# Patient Record
Sex: FEMALE | Race: WHITE | NOT HISPANIC OR LATINO | URBAN - METROPOLITAN AREA
[De-identification: names, ages, dates, MRNs, and addresses within clinical notes are randomized per-mention and may not be internally consistent; named-entity substitution may affect disease eponyms.]

---

## 2022-04-09 ENCOUNTER — HISTORICAL (OUTPATIENT)
Dept: ADMINISTRATIVE | Facility: HOSPITAL | Age: 67
End: 2022-04-09
Payer: COMMERCIAL

## 2022-04-25 VITALS
DIASTOLIC BLOOD PRESSURE: 76 MMHG | WEIGHT: 169.75 LBS | HEIGHT: 62 IN | SYSTOLIC BLOOD PRESSURE: 126 MMHG | BODY MASS INDEX: 31.24 KG/M2

## 2022-08-30 PROBLEM — E66.9 OBESITY: Status: ACTIVE | Noted: 2022-08-30

## 2022-08-30 PROBLEM — I10 BENIGN ESSENTIAL HYPERTENSION: Status: ACTIVE | Noted: 2022-08-30

## 2022-08-30 PROBLEM — E55.9 VITAMIN D DEFICIENCY: Status: ACTIVE | Noted: 2022-08-30

## 2022-08-30 PROBLEM — E78.5 HYPERLIPIDEMIA: Status: ACTIVE | Noted: 2022-08-30

## 2022-08-30 PROBLEM — N32.81 OAB (OVERACTIVE BLADDER): Status: ACTIVE | Noted: 2022-08-30

## 2023-02-03 ENCOUNTER — TELEPHONE (OUTPATIENT)
Dept: ORTHOPEDICS | Facility: CLINIC | Age: 68
End: 2023-02-03
Payer: COMMERCIAL

## 2023-02-03 NOTE — TELEPHONE ENCOUNTER
PT called asking if you could take a look at her bone density scan that she had done and asked if she would need to come in for results or if you can call her with them. Last seen pt in March of 2022.

## 2023-02-15 PROBLEM — Z00.00 ANNUAL PHYSICAL EXAM: Status: ACTIVE | Noted: 2023-02-15

## 2023-02-15 PROBLEM — M85.80 OSTEOPENIA: Status: ACTIVE | Noted: 2023-02-15

## 2023-05-11 ENCOUNTER — OFFICE VISIT (OUTPATIENT)
Dept: ORTHOPEDICS | Facility: CLINIC | Age: 68
End: 2023-05-11
Payer: COMMERCIAL

## 2023-05-11 ENCOUNTER — HOSPITAL ENCOUNTER (OUTPATIENT)
Dept: RADIOLOGY | Facility: CLINIC | Age: 68
Discharge: HOME | End: 2023-05-11
Attending: PHYSICIAN ASSISTANT
Payer: COMMERCIAL

## 2023-05-11 VITALS
SYSTOLIC BLOOD PRESSURE: 152 MMHG | BODY MASS INDEX: 30.81 KG/M2 | HEART RATE: 80 BPM | WEIGHT: 163.19 LBS | DIASTOLIC BLOOD PRESSURE: 76 MMHG | HEIGHT: 61 IN

## 2023-05-11 DIAGNOSIS — L84 FOOT CALLUS: ICD-10-CM

## 2023-05-11 DIAGNOSIS — M79.671 RIGHT FOOT PAIN: ICD-10-CM

## 2023-05-11 DIAGNOSIS — M21.621 BUNIONETTE OF RIGHT FOOT: ICD-10-CM

## 2023-05-11 DIAGNOSIS — M79.671 RIGHT FOOT PAIN: Primary | ICD-10-CM

## 2023-05-11 PROCEDURE — 1159F MED LIST DOCD IN RCRD: CPT | Mod: CPTII,,, | Performed by: PHYSICIAN ASSISTANT

## 2023-05-11 PROCEDURE — 3077F SYST BP >= 140 MM HG: CPT | Mod: CPTII,,, | Performed by: PHYSICIAN ASSISTANT

## 2023-05-11 PROCEDURE — 3288F PR FALLS RISK ASSESSMENT DOCUMENTED: ICD-10-PCS | Mod: CPTII,,, | Performed by: PHYSICIAN ASSISTANT

## 2023-05-11 PROCEDURE — 1101F PT FALLS ASSESS-DOCD LE1/YR: CPT | Mod: CPTII,,, | Performed by: PHYSICIAN ASSISTANT

## 2023-05-11 PROCEDURE — 1160F PR REVIEW ALL MEDS BY PRESCRIBER/CLIN PHARMACIST DOCUMENTED: ICD-10-PCS | Mod: CPTII,,, | Performed by: PHYSICIAN ASSISTANT

## 2023-05-11 PROCEDURE — 3077F PR MOST RECENT SYSTOLIC BLOOD PRESSURE >= 140 MM HG: ICD-10-PCS | Mod: CPTII,,, | Performed by: PHYSICIAN ASSISTANT

## 2023-05-11 PROCEDURE — 3078F PR MOST RECENT DIASTOLIC BLOOD PRESSURE < 80 MM HG: ICD-10-PCS | Mod: CPTII,,, | Performed by: PHYSICIAN ASSISTANT

## 2023-05-11 PROCEDURE — 73630 X-RAY EXAM OF FOOT: CPT | Mod: RT,,, | Performed by: PHYSICIAN ASSISTANT

## 2023-05-11 PROCEDURE — 99203 PR OFFICE/OUTPT VISIT, NEW, LEVL III, 30-44 MIN: ICD-10-PCS | Mod: ,,, | Performed by: PHYSICIAN ASSISTANT

## 2023-05-11 PROCEDURE — 1159F PR MEDICATION LIST DOCUMENTED IN MEDICAL RECORD: ICD-10-PCS | Mod: CPTII,,, | Performed by: PHYSICIAN ASSISTANT

## 2023-05-11 PROCEDURE — 1101F PR PT FALLS ASSESS DOC 0-1 FALLS W/OUT INJ PAST YR: ICD-10-PCS | Mod: CPTII,,, | Performed by: PHYSICIAN ASSISTANT

## 2023-05-11 PROCEDURE — 3288F FALL RISK ASSESSMENT DOCD: CPT | Mod: CPTII,,, | Performed by: PHYSICIAN ASSISTANT

## 2023-05-11 PROCEDURE — 3008F BODY MASS INDEX DOCD: CPT | Mod: CPTII,,, | Performed by: PHYSICIAN ASSISTANT

## 2023-05-11 PROCEDURE — 99203 OFFICE O/P NEW LOW 30 MIN: CPT | Mod: ,,, | Performed by: PHYSICIAN ASSISTANT

## 2023-05-11 PROCEDURE — 1160F RVW MEDS BY RX/DR IN RCRD: CPT | Mod: CPTII,,, | Performed by: PHYSICIAN ASSISTANT

## 2023-05-11 PROCEDURE — 3078F DIAST BP <80 MM HG: CPT | Mod: CPTII,,, | Performed by: PHYSICIAN ASSISTANT

## 2023-05-11 PROCEDURE — 73630 XR FOOT COMPLETE 3 VIEW RIGHT: ICD-10-PCS | Mod: RT,,, | Performed by: PHYSICIAN ASSISTANT

## 2023-05-11 PROCEDURE — 3008F PR BODY MASS INDEX (BMI) DOCUMENTED: ICD-10-PCS | Mod: CPTII,,, | Performed by: PHYSICIAN ASSISTANT

## 2023-05-11 NOTE — PROGRESS NOTES
"Chief Complaint:   Chief Complaint   Patient presents with    Foot Pain     R foot states it started about month ago. states she feels hardness underneath her foot. reports a pinch feeling when she walks. 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        History of present illness:    This is a 67 y.o. year old female who complains of painful bump on the outer side of her foot near the little toe.    She is had no injury or trauma but she feels a hard spot that she wants to have it evaluated.    She does work on her feet and walk a lot is a  and it does cause her some discomfort    Review of Systems:    Constitution:   Denies chills, fever, and sweats.  HENT:   Denies headaches or blurry vision.  Cardiovascular:  Denies chest pain or irregular heart beat.  Respiratory:   Denies cough or shortness of breath.  Gastrointestinal:  Denies abdominal pain, nausea, or vomiting.  Musculoskeletal:   Denies muscle cramps.  Neurological:   Denies dizziness or focal weakness.  Psychiatric/Behavior: Normal mental status.  Hematology/Lymph:  Denies bleeding problem or easy bruising/bleeding.  Skin:    Denies rash or suspicious lesions.    Examination:    Vital Signs:    Vitals:    05/11/23 0857   BP: (!) 152/76   Pulse: 80   Weight: 74 kg (163 lb 3.2 oz)   Height: 5' 1" (1.549 m)       Body mass index is 30.84 kg/m².    Constitution:   Well-developed, well nourished patient in no acute distress.  Neurological:   Alert and oriented x 3 and cooperative to examination.     Psychiatric/Behavior: Normal mental status.  Respiratory:   No shortness of breath.  Eyes:    Extraoccular muscles intact  Skin:    No scars, rash or suspicious lesions.    Physical Exam:   Right foot and ankle  No obvious deformity. Plantar flexion and dorsiflexion is 60 degrees and 30 degrees, respectively.  Negative squeeze test.  Negative lateral malleolus tenderness.  Negative medial malleolus tenderness.  Negative talofibular ligament " tenderness.  Negative anterior draw and lateral tilt.  5/5 strength, normal skin appearance and palpable pulses distally.  Sensibility normal.  Patient does have a small bunionette on the right foot  Callus formation over the lateral aspect of the 5th MTP joint    Imaging: X-rays ordered and images interpreted today personally by me of right foot three views   No acute osseous abnormalities noted  Patient was have a small lateral osteophyte on the 5th metatarsal head  She does have some calcaneal spurring   Mild midfoot arthritic changes        Assessment: Right foot pain  -     X-Ray Foot Complete Right; Future; Expected date: 05/11/2023    Foot callus    Bunionette of right foot         Plan:  This point the patient has a callus shaved down the office today.    She is going to use some salicylic acid to help soften the area and a pumice stone to keep it smooth.    She needs to be mindful of her work shoes that they are wide enough toe box she states they are little bit tight and this is more than likely causing the friction in the callus formation.    She will call the office if she has any increasing problems in the future          DISCLAIMER: This note may have been dictated using voice recognition software and may contain grammatical errors.     NOTE: Consult report sent to referring provider via [facility].

## 2023-05-22 PROBLEM — Z00.00 ANNUAL PHYSICAL EXAM: Status: RESOLVED | Noted: 2023-02-15 | Resolved: 2023-05-22

## 2023-07-12 ENCOUNTER — OFFICE VISIT (OUTPATIENT)
Dept: ORTHOPEDICS | Facility: CLINIC | Age: 68
End: 2023-07-12
Payer: COMMERCIAL

## 2023-07-12 VITALS
TEMPERATURE: 97 F | HEART RATE: 83 BPM | SYSTOLIC BLOOD PRESSURE: 134 MMHG | WEIGHT: 162.94 LBS | HEIGHT: 61 IN | DIASTOLIC BLOOD PRESSURE: 73 MMHG | BODY MASS INDEX: 30.76 KG/M2

## 2023-07-12 DIAGNOSIS — M17.11 PRIMARY OSTEOARTHRITIS OF RIGHT KNEE: ICD-10-CM

## 2023-07-12 DIAGNOSIS — M17.12 PRIMARY OSTEOARTHRITIS OF LEFT KNEE: Primary | ICD-10-CM

## 2023-07-12 PROCEDURE — 3078F PR MOST RECENT DIASTOLIC BLOOD PRESSURE < 80 MM HG: ICD-10-PCS | Mod: CPTII,,, | Performed by: REHABILITATION UNIT

## 2023-07-12 PROCEDURE — 20610 DRAIN/INJ JOINT/BURSA W/O US: CPT | Mod: 50,,, | Performed by: REHABILITATION UNIT

## 2023-07-12 PROCEDURE — 3044F HG A1C LEVEL LT 7.0%: CPT | Mod: CPTII,,, | Performed by: REHABILITATION UNIT

## 2023-07-12 PROCEDURE — 3075F PR MOST RECENT SYSTOLIC BLOOD PRESS GE 130-139MM HG: ICD-10-PCS | Mod: CPTII,,, | Performed by: REHABILITATION UNIT

## 2023-07-12 PROCEDURE — 3075F SYST BP GE 130 - 139MM HG: CPT | Mod: CPTII,,, | Performed by: REHABILITATION UNIT

## 2023-07-12 PROCEDURE — 3008F PR BODY MASS INDEX (BMI) DOCUMENTED: ICD-10-PCS | Mod: CPTII,,, | Performed by: REHABILITATION UNIT

## 2023-07-12 PROCEDURE — 20610 LARGE JOINT ASPIRATION/INJECTION: BILATERAL KNEE: ICD-10-PCS | Mod: 50,,, | Performed by: REHABILITATION UNIT

## 2023-07-12 PROCEDURE — 1101F PR PT FALLS ASSESS DOC 0-1 FALLS W/OUT INJ PAST YR: ICD-10-PCS | Mod: CPTII,,, | Performed by: REHABILITATION UNIT

## 2023-07-12 PROCEDURE — 1101F PT FALLS ASSESS-DOCD LE1/YR: CPT | Mod: CPTII,,, | Performed by: REHABILITATION UNIT

## 2023-07-12 PROCEDURE — 99213 OFFICE O/P EST LOW 20 MIN: CPT | Mod: 25,,, | Performed by: REHABILITATION UNIT

## 2023-07-12 PROCEDURE — 3288F FALL RISK ASSESSMENT DOCD: CPT | Mod: CPTII,,, | Performed by: REHABILITATION UNIT

## 2023-07-12 PROCEDURE — 3008F BODY MASS INDEX DOCD: CPT | Mod: CPTII,,, | Performed by: REHABILITATION UNIT

## 2023-07-12 PROCEDURE — 1159F PR MEDICATION LIST DOCUMENTED IN MEDICAL RECORD: ICD-10-PCS | Mod: CPTII,,, | Performed by: REHABILITATION UNIT

## 2023-07-12 PROCEDURE — 3044F PR MOST RECENT HEMOGLOBIN A1C LEVEL <7.0%: ICD-10-PCS | Mod: CPTII,,, | Performed by: REHABILITATION UNIT

## 2023-07-12 PROCEDURE — 3078F DIAST BP <80 MM HG: CPT | Mod: CPTII,,, | Performed by: REHABILITATION UNIT

## 2023-07-12 PROCEDURE — 99213 PR OFFICE/OUTPT VISIT, EST, LEVL III, 20-29 MIN: ICD-10-PCS | Mod: 25,,, | Performed by: REHABILITATION UNIT

## 2023-07-12 PROCEDURE — 1159F MED LIST DOCD IN RCRD: CPT | Mod: CPTII,,, | Performed by: REHABILITATION UNIT

## 2023-07-12 PROCEDURE — 3288F PR FALLS RISK ASSESSMENT DOCUMENTED: ICD-10-PCS | Mod: CPTII,,, | Performed by: REHABILITATION UNIT

## 2023-07-12 RX ORDER — BETAMETHASONE SODIUM PHOSPHATE AND BETAMETHASONE ACETATE 3; 3 MG/ML; MG/ML
12 INJECTION, SUSPENSION INTRA-ARTICULAR; INTRALESIONAL; INTRAMUSCULAR; SOFT TISSUE
Status: DISCONTINUED | OUTPATIENT
Start: 2023-07-12 | End: 2023-07-12 | Stop reason: HOSPADM

## 2023-07-12 RX ORDER — LIDOCAINE HYDROCHLORIDE 20 MG/ML
4 INJECTION, SOLUTION INFILTRATION; PERINEURAL
Status: DISCONTINUED | OUTPATIENT
Start: 2023-07-12 | End: 2023-07-12 | Stop reason: HOSPADM

## 2023-07-12 RX ADMIN — LIDOCAINE HYDROCHLORIDE 4 MG: 20 INJECTION, SOLUTION INFILTRATION; PERINEURAL at 09:07

## 2023-07-12 RX ADMIN — BETAMETHASONE SODIUM PHOSPHATE AND BETAMETHASONE ACETATE 12 MG: 3; 3 INJECTION, SUSPENSION INTRA-ARTICULAR; INTRALESIONAL; INTRAMUSCULAR; SOFT TISSUE at 09:07

## 2023-07-12 NOTE — PROGRESS NOTES
Subjective:      Patient ID: Kailey Baer is a 68 y.o. female.    Chief Complaint: Pain of the Left Knee, Pain of the Right Knee, and Knee Pain (Pt is here with Wally knee pain, requesting cortisone injection, no previous inj. pt states the volteran gel helps but she is going on vacation and wanting an injection for precaution, pt states no other changes)    HPI:   Kailey Baer is a 68 y.o. female who presents today for evaluation of her bilateral knees.  I have seen her over a year ago for her left knee.  She reports intermittent pain localized medially to her knees.  It is associated with activity.  She works as a  so she is on her feet frequently.  She has been using Voltaren gel and taking Mobic as needed.  These have been helpful.  She denies any sensory or motor changes distally.  No instability or mechanical symptoms.  She is going on a cruise in a couple of weeks for several weeks and knows that her knees will be bothering her so she would like injections today.    History reviewed. No pertinent past medical history.  Past Surgical History:   Procedure Laterality Date    Adenoma(s) or other neoplasm not detected during screening colonoscopy ([facility]ADR)   01/2013    COLONOSCOPY  12/03/2013    Excision of ruptured ectopic tubal pregnancy      Rubber band ligation of hemorrhoid(s)   12/26/2013    Salpingotomy       Social History     Socioeconomic History    Marital status:    Tobacco Use    Smoking status: Never    Smokeless tobacco: Never         Current Outpatient Medications:     amLODIPine (NORVASC) 5 MG tablet, Take 1 tablet (5 mg total) by mouth 2 (two) times daily., Disp: 180 tablet, Rfl: 1    calcium carbonate (OS-MARCI) 600 mg calcium (1,500 mg) Tab, Take 600 mg by mouth 2 (two) times daily with meals., Disp: , Rfl:     cholecalciferol, vitamin D3, (VITAMIN D3) 50 mcg (2,000 unit) Cap capsule, Take 50 mcg by mouth once daily., Disp: , Rfl:     diclofenac sodium (VOLTAREN) 1 % Gel, Apply 2 g  "topically once daily., Disp: , Rfl:     doxycycline (VIBRAMYCIN) 100 MG Cap, Take 1 capsule (100 mg total) by mouth every 12 (twelve) hours. for 14 days, Disp: 28 capsule, Rfl: 0    erythromycin (ROMYCIN) ophthalmic ointment, Apply 1 cm ribbon in left eye every 4 hours x7 days, Disp: 1 g, Rfl: 0    meloxicam (MOBIC) 15 MG tablet, Take 1 tablet (15 mg total) by mouth daily as needed for Pain., Disp: 30 tablet, Rfl: 2    mupirocin (BACTROBAN) 2 % ointment, Apply topically 3 (three) times daily. for 7 days, Disp: 15 g, Rfl: 0    scopolamine (TRANSDERM-SCOP) 1.3-1.5 mg (1 mg over 3 days), Place 1 patch onto the skin every 72 hours. for 24 days, Disp: 8 patch, Rfl: 0  Review of patient's allergies indicates:  No Known Allergies    /73 (BP Location: Left arm)   Pulse 83   Temp 97.4 °F (36.3 °C)   Ht 5' 1" (1.549 m)   Wt 73.9 kg (162 lb 14.7 oz)   BMI 30.78 kg/m²     Comprehensive review of systems completed and negative except as per HPI.        Objective:   Head: Normocephalic, without obvious abnormality, atraumatic  Eyes: conjunctivae/corneas clear. EOM's intact  Ears: normal external appearance  Nose: Nares normal. Septum midline. Mucosa normal. No drainage  Throat: normal findings: lips normal without lesions  Lungs: unlabored breathing on room air  Chest wall: symmetric chest rise  Heart: regular rate and rhythm  Pulses: 2+ and symmetric  Skin: Skin color, texture, turgor normal. No rashes or lesions  Neurologic: Grossly normal    bilateral KNEE:     Alignment: neutral  Appearance: skin in intact without lesion  Effusion: none  Gait: antalgic  Straight Leg Raise: negative  Log Roll: negative  Hip ROM: full and painless  Ankle ROM: full and painless   Knee ROM:  0 - 125  Tenderness: minimal TTP to medial joint lines  Patellar Mobility: normal  Patellar grind: negative  J Sign: negative  PF Crepitus: negative        Jada Test: negative   Valgus Stress @ 0 deg: stable  Valgus Stress @ 30 deg: " stable  Varus Stress @ 0 deg: stable  Varus Stress @ 30 deg: stable  Lachman: stable  Ant Drawer: negative   Post Drawer: negative  Posterior Sag Sign: negative  Neurological deficits: SILT dp/sp/t distributions  Motor: 5/5 EHL/FHL/TA/GS    Warm well perfused lower extremity with capillary refill less than 2 seconds and sensation intact to light touch in the terminal nerve distributions. Calf soft and easily compressible without clinical sign of DVT. No palpable popliteal lymphadenopathy    Assessment:     Imaging:   Previous radiographs obtained 03/2022 reviewed showing no acute fractures dislocations.  Mild medial compartment arthritis bilaterally as well as some within the patellofemoral compartment.    Large Joint Aspiration/Injection: bilateral knee    Date/Time: 7/12/2023 9:00 AM  Performed by: Carlin Leyva MD  Authorized by: Carlin Leyva MD     Consent Done?:  Yes (Verbal)  Indications:  Pain and arthritis  Site marked: the procedure site was marked    Timeout: prior to procedure the correct patient, procedure, and site was verified    Prep: patient was prepped and draped in usual sterile fashion    Local anesthesia used?: No      Details:  Needle Size:  22 G  Ultrasonic Guidance for needle placement?: No    Approach:  Anterolateral  Location:  Knee  Laterality:  Bilateral  Site:  Bilateral knee  Medications (Right):  4 mg LIDOcaine HCL 20 mg/ml (2%) 20 mg/mL (2 %); 12 mg betamethasone acetate-betamethasone sodium phosphate 6 mg/mL  Medications (Left):  4 mg LIDOcaine HCL 20 mg/ml (2%) 20 mg/mL (2 %); 12 mg betamethasone acetate-betamethasone sodium phosphate 6 mg/mL  Patient tolerance:  Patient tolerated the procedure well with no immediate complications        1. Primary osteoarthritis of left knee    2. Primary osteoarthritis of right knee          Plan:       Orders Placed This Encounter    Large Joint Aspiration/Injection: bilateral knee     Imaging and exam findings discussed with the patient.   She has mild osteoarthritis changes of both of her knees.  She usually does well with Mobic and Voltaren gel.  She is going on a several [place] and would like bilateral knee injections.  These were provided as above.  Post-injection care was discussed.  She will follow up with me as needed.  Continue symptomatic treatment with her Mobic and Voltaren gel as needed.  Low-impact exercises.  Weight loss.  All of her questions were answered and she was happy and in agreement.

## 2024-03-04 PROBLEM — I10 BENIGN ESSENTIAL HYPERTENSION: Chronic | Status: ACTIVE | Noted: 2022-08-30

## 2024-03-04 PROBLEM — E78.5 HYPERLIPIDEMIA: Chronic | Status: ACTIVE | Noted: 2022-08-30

## 2024-06-03 PROBLEM — Z00.00 MEDICARE ANNUAL WELLNESS VISIT, SUBSEQUENT: Status: RESOLVED | Noted: 2023-02-15 | Resolved: 2024-06-03

## 2024-11-13 ENCOUNTER — OFFICE VISIT (OUTPATIENT)
Dept: ORTHOPEDICS | Facility: CLINIC | Age: 69
End: 2024-11-13
Payer: MEDICARE

## 2024-11-13 VITALS — WEIGHT: 167.13 LBS | BODY MASS INDEX: 31.55 KG/M2 | HEIGHT: 61 IN

## 2024-11-13 DIAGNOSIS — M17.11 PRIMARY OSTEOARTHRITIS OF RIGHT KNEE: Primary | ICD-10-CM

## 2024-11-13 DIAGNOSIS — M17.12 PRIMARY OSTEOARTHRITIS OF LEFT KNEE: ICD-10-CM

## 2024-11-13 RX ORDER — BETAMETHASONE SODIUM PHOSPHATE AND BETAMETHASONE ACETATE 3; 3 MG/ML; MG/ML
12 INJECTION, SUSPENSION INTRA-ARTICULAR; INTRALESIONAL; INTRAMUSCULAR; SOFT TISSUE
Status: DISCONTINUED | OUTPATIENT
Start: 2024-11-13 | End: 2024-11-13 | Stop reason: HOSPADM

## 2024-11-13 RX ORDER — LIDOCAINE HYDROCHLORIDE 10 MG/ML
2 INJECTION, SOLUTION INFILTRATION; PERINEURAL
Status: DISCONTINUED | OUTPATIENT
Start: 2024-11-13 | End: 2024-11-13 | Stop reason: HOSPADM

## 2024-11-13 RX ADMIN — LIDOCAINE HYDROCHLORIDE 2 ML: 10 INJECTION, SOLUTION INFILTRATION; PERINEURAL at 08:11

## 2024-11-13 RX ADMIN — BETAMETHASONE SODIUM PHOSPHATE AND BETAMETHASONE ACETATE 12 MG: 3; 3 INJECTION, SUSPENSION INTRA-ARTICULAR; INTRALESIONAL; INTRAMUSCULAR; SOFT TISSUE at 08:11

## 2024-11-13 NOTE — PROGRESS NOTES
Subjective:      Patient ID: Kailey Baer is a 69 y.o. female.    Chief Complaint: Knee Pain (C/o bilateral knee pain, requesting more injections. Has been over a year since last injection and stated it helped her for a long time. )    HPI:   Kailey Baer is a 69 y.o. female who presents today for evaluation of her bilateral knees. She was seen over a year ago for bilateral knee pain and received bilateral knee steroid injections. She states that it has helped until recently. She reports intermittent pain localized medially to her knees. She says that holiday season is coming up so she is on her feet a lot during that time. She would like repeat injections today.     Prior HPI:  I have seen her over a year ago for her left knee.  She reports intermittent pain localized medially to her knees.  It is associated with activity.  She works as a  so she is on her feet frequently.  She has been using Voltaren gel and taking Mobic as needed.  These have been helpful.  She denies any sensory or motor changes distally.  No instability or mechanical symptoms.  She is going on a cruise in a couple of weeks for several weeks and knows that her knees will be bothering her so she would like injections today.    No past medical history on file.  Past Surgical History:   Procedure Laterality Date    Adenoma(s) or other neoplasm not detected during screening colonoscopy ([facility])   01/2013    COLONOSCOPY  12/03/2013    Excision of ruptured ectopic tubal pregnancy      Rubber band ligation of hemorrhoid(s)   12/26/2013    Salpingotomy       Social History     Socioeconomic History    Marital status:    Tobacco Use    Smoking status: Never    Smokeless tobacco: Never   Substance and Sexual Activity    Alcohol use: Not Currently    Drug use: Never    Sexual activity: Yes     Partners: Male     Birth control/protection: None         Current Outpatient Medications:     amLODIPine (NORVASC) 5 MG tablet, Take 1 tablet (5 mg  "total) by mouth once daily., Disp: 90 tablet, Rfl: 0    calcium carbonate (OS-MARCI) 600 mg calcium (1,500 mg) Tab, Take 600 mg by mouth 2 (two) times daily with meals., Disp: , Rfl:     cholecalciferol, vitamin D3, (VITAMIN D3) 50 mcg (2,000 unit) Cap capsule, Take 1 capsule (2,000 Units total) by mouth once daily., Disp: 90 capsule, Rfl: 0    diclofenac sodium (VOLTAREN) 1 % Gel, Apply 2 g topically once daily., Disp: , Rfl:     fluticasone propionate (FLONASE) 50 mcg/actuation nasal spray, USE 1 SPRAY INTRANASAL AS DIRECTED ONCE DAILY, Disp: , Rfl:     meloxicam (MOBIC) 15 MG tablet, Take 1 tablet (15 mg total) by mouth daily as needed for Pain., Disp: 90 tablet, Rfl: 0    telmisartan (MICARDIS) 80 MG Tab, Take 1/2 tab twice daily., Disp: 90 tablet, Rfl: 0    oxybutynin (DITROPAN) 5 MG Tab, Take 1 tablet (5 mg total) by mouth 3 (three) times daily., Disp: 270 tablet, Rfl: 0  Review of patient's allergies indicates:  No Known Allergies    Ht 5' 1" (1.549 m)   Wt 75.8 kg (167 lb 1.7 oz)   BMI 31.57 kg/m²     Comprehensive review of systems completed and negative except as per HPI.        Objective:   Head: Normocephalic, without obvious abnormality, atraumatic  Eyes: conjunctivae/corneas clear. EOM's intact  Ears: normal external appearance  Nose: Nares normal. Septum midline. Mucosa normal. No drainage  Throat: normal findings: lips normal without lesions  Lungs: unlabored breathing on room air  Chest wall: symmetric chest rise  Heart: regular rate and rhythm  Pulses: 2+ and symmetric  Skin: Skin color, texture, turgor normal. No rashes or lesions  Neurologic: Grossly normal    bilateral KNEE:     Alignment: neutral  Appearance: skin in intact without lesion  Effusion: none  Gait: antalgic  Straight Leg Raise: negative  Log Roll: negative  Hip ROM: full and painless  Ankle ROM: full and painless   Knee ROM:  0 - 125  Tenderness: minimal TTP to medial joint lines  Patellar Mobility: normal  Patellar grind: " negative  J Sign: negative  PF Crepitus: negative        Jada Test: negative   Valgus Stress @ 0 deg: stable  Valgus Stress @ 30 deg: stable  Varus Stress @ 0 deg: stable  Varus Stress @ 30 deg: stable  Lachman: stable  Ant Drawer: negative   Post Drawer: negative  Posterior Sag Sign: negative  Neurological deficits: SILT dp/sp/t distributions  Motor: 5/5 EHL/FHL/TA/GS    Warm well perfused lower extremity with capillary refill less than 2 seconds and sensation intact to light touch in the terminal nerve distributions. Calf soft and easily compressible without clinical sign of DVT. No palpable popliteal lymphadenopathy    Assessment:     Imaging:   Previous radiographs obtained 03/2022 reviewed showing no acute fractures dislocations.  Mild medial compartment arthritis bilaterally as well as some within the patellofemoral compartment.    Large Joint Aspiration/Injection: bilateral knee    Date/Time: 11/13/2024 8:45 AM    Performed by: Chrystal Birmingham PA-C  Authorized by: Carlin Leyva MD    Consent Done?:  Yes (Verbal)  Indications:  Pain and arthritis  Site marked: the procedure site was marked    Timeout: prior to procedure the correct patient, procedure, and site was verified    Prep: patient was prepped and draped in usual sterile fashion    Local anesthesia used?: No      Details:  Needle Size:  22 G  Ultrasonic Guidance for needle placement?: No    Approach:  Anterolateral  Location:  Knee  Laterality:  Bilateral  Site:  Bilateral knee  Medications (Right):  12 mg betamethasone acetate-betamethasone sodium phosphate 6 mg/mL; 2 mL LIDOcaine HCL 10 mg/ml (1%) 10 mg/mL (1 %)  Medications (Left):  12 mg betamethasone acetate-betamethasone sodium phosphate 6 mg/mL; 2 mL LIDOcaine HCL 10 mg/ml (1%) 10 mg/mL (1 %)  Patient tolerance:  Patient tolerated the procedure well with no immediate complications          No diagnosis found.        Plan:       Orders Placed This Encounter    Large Joint  Aspiration/Injection     She has mild osteoarthritis changes of both of her knees.  She usually does well with Mobic and Voltaren gel.  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  These were provided as above.  Post-injection care was discussed.  She will follow up with me as needed.  Continue symptomatic treatment with her Mobic and Voltaren gel as needed.  Low-impact exercises.  Weight loss.  All of her questions were answered and she was happy and in agreement.

## 2025-04-21 ENCOUNTER — HOSPITAL ENCOUNTER (OUTPATIENT)
Dept: RADIOLOGY | Facility: CLINIC | Age: 70
Discharge: HOME | End: 2025-04-21
Payer: MEDICARE

## 2025-04-21 ENCOUNTER — OFFICE VISIT (OUTPATIENT)
Dept: ORTHOPEDICS | Facility: CLINIC | Age: 70
End: 2025-04-21
Payer: MEDICARE

## 2025-04-21 VITALS
HEIGHT: 61 IN | BODY MASS INDEX: 31.34 KG/M2 | DIASTOLIC BLOOD PRESSURE: 70 MMHG | WEIGHT: 166 LBS | HEART RATE: 87 BPM | SYSTOLIC BLOOD PRESSURE: 126 MMHG

## 2025-04-21 DIAGNOSIS — M17.11 PRIMARY OSTEOARTHRITIS OF RIGHT KNEE: Primary | ICD-10-CM

## 2025-04-21 DIAGNOSIS — M17.11 PRIMARY OSTEOARTHRITIS OF RIGHT KNEE: ICD-10-CM

## 2025-04-21 DIAGNOSIS — M17.12 PRIMARY OSTEOARTHRITIS OF LEFT KNEE: ICD-10-CM

## 2025-04-21 PROCEDURE — 20610 DRAIN/INJ JOINT/BURSA W/O US: CPT | Mod: 50,,,

## 2025-04-21 PROCEDURE — 99213 OFFICE O/P EST LOW 20 MIN: CPT | Mod: 25,,,

## 2025-04-21 PROCEDURE — 73564 X-RAY EXAM KNEE 4 OR MORE: CPT | Mod: 50,,,

## 2025-04-21 RX ORDER — BETAMETHASONE SODIUM PHOSPHATE AND BETAMETHASONE ACETATE 3; 3 MG/ML; MG/ML
12 INJECTION, SUSPENSION INTRA-ARTICULAR; INTRALESIONAL; INTRAMUSCULAR; SOFT TISSUE
Status: DISCONTINUED | OUTPATIENT
Start: 2025-04-21 | End: 2025-04-21 | Stop reason: HOSPADM

## 2025-04-21 RX ORDER — LIDOCAINE HYDROCHLORIDE 10 MG/ML
2 INJECTION, SOLUTION INFILTRATION; PERINEURAL
Status: DISCONTINUED | OUTPATIENT
Start: 2025-04-21 | End: 2025-04-21 | Stop reason: HOSPADM

## 2025-04-21 RX ADMIN — LIDOCAINE HYDROCHLORIDE 2 ML: 10 INJECTION, SOLUTION INFILTRATION; PERINEURAL at 02:04

## 2025-04-21 RX ADMIN — BETAMETHASONE SODIUM PHOSPHATE AND BETAMETHASONE ACETATE 12 MG: 3; 3 INJECTION, SUSPENSION INTRA-ARTICULAR; INTRALESIONAL; INTRAMUSCULAR; SOFT TISSUE at 02:04

## 2025-04-21 NOTE — PROGRESS NOTES
Subjective:      Patient ID: Kailey Baer is a 69 y.o. female.    Chief Complaint: Knee Pain (Wally knee pain, last injection given wally knee 11/13/24- Would like Injections in wally knee today. Stated last injections has helped with the knees. )    HPI:   Kailey Baer is a 69 y.o. female who presents today for evaluation of her bilateral knees. She was seen about 6 months ago for bilateral knee pain and received bilateral knee steroid injections. She states that it has helped until recently. She reports intermittent pain localized medially to her knees. She would like repeat injections today.       History reviewed. No pertinent past medical history.  Past Surgical History:   Procedure Laterality Date    Adenoma(s) or other neoplasm not detected during screening colonoscopy (SCADR)   01/2013    COLONOSCOPY  12/03/2013    Excision of ruptured ectopic tubal pregnancy      Rubber band ligation of hemorrhoid(s)   12/26/2013    Salpingotomy       Social History     Socioeconomic History    Marital status:    Tobacco Use    Smoking status: Never    Smokeless tobacco: Never   Substance and Sexual Activity    Alcohol use: Not Currently    Drug use: Never    Sexual activity: Yes     Partners: Male     Birth control/protection: None         Current Outpatient Medications:     benzonatate (TESSALON) 200 MG capsule, Take 200 mg by mouth 3 (three) times daily as needed for Cough., Disp: , Rfl:     calcium carbonate (OS-MARCI) 600 mg calcium (1,500 mg) Tab, Take 600 mg by mouth 2 (two) times daily with meals., Disp: , Rfl:     cefdinir (OMNICEF) 300 MG capsule, Take 300 mg by mouth every 12 (twelve) hours., Disp: , Rfl:     cholecalciferol, vitamin D3, (VITAMIN D3) 50 mcg (2,000 unit) Cap capsule, Take 1 capsule (2,000 Units total) by mouth once daily., Disp: 90 capsule, Rfl: 0    diclofenac sodium (VOLTAREN) 1 % Gel, Apply 2 g topically once daily., Disp: , Rfl:     fluticasone propionate (FLONASE) 50 mcg/actuation nasal  "spray, USE 1 SPRAY INTRANASAL AS DIRECTED ONCE DAILY, Disp: , Rfl:     telmisartan (MICARDIS) 80 MG Tab, Take 1/2 tab twice daily., Disp: 90 tablet, Rfl: 0    acetaZOLAMIDE (DIAMOX) 125 MG Tab, Take 1 tablet (125 mg total) by mouth 2 (two) times daily. Take 1 tablet b.i.d. initiating prescription 1 day prior to  ascent and completing prescription 2 days after reaching highest altitude. for 5 days (Patient not taking: Reported on 4/1/2025), Disp: 10 tablet, Rfl: 0    amLODIPine (NORVASC) 5 MG tablet, Take 1 tablet (5 mg total) by mouth once daily., Disp: 90 tablet, Rfl: 0    meloxicam (MOBIC) 15 MG tablet, Take 1 tablet (15 mg total) by mouth daily as needed for Pain. (Patient not taking: Reported on 4/21/2025), Disp: 90 tablet, Rfl: 0    methylPREDNISolone (MEDROL DOSEPACK) 4 mg tablet, use as directed on pack (Patient not taking: Reported on 4/21/2025), Disp: 1 each, Rfl: 0    oxybutynin (DITROPAN) 5 MG Tab, Take 1 tablet (5 mg total) by mouth 3 (three) times daily., Disp: 270 tablet, Rfl: 0  Review of patient's allergies indicates:  No Known Allergies    /70   Pulse 87   Ht 5' 1" (1.549 m)   Wt 75.3 kg (166 lb 0.1 oz)   BMI 31.37 kg/m²     Comprehensive review of systems completed and negative except as per HPI.        Objective:   Head: Normocephalic, without obvious abnormality, atraumatic  Eyes: conjunctivae/corneas clear. EOM's intact  Ears: normal external appearance  Nose: Nares normal. Septum midline. Mucosa normal. No drainage  Throat: normal findings: lips normal without lesions  Lungs: unlabored breathing on room air  Chest wall: symmetric chest rise  Heart: regular rate and rhythm  Pulses: 2+ and symmetric  Skin: Skin color, texture, turgor normal. No rashes or lesions  Neurologic: Grossly normal    bilateral KNEE:     Alignment: neutral  Appearance: skin in intact without lesion  Effusion: none  Gait: antalgic  Straight Leg Raise: negative  Log Roll: negative  Hip ROM: full and " painless  Ankle ROM: full and painless   Knee ROM:  0 - 125  Tenderness: minimal TTP to medial joint lines  Patellar Mobility: normal  Patellar grind: negative  J Sign: negative  PF Crepitus: negative        Jada Test: negative   Valgus Stress @ 0 deg: stable  Valgus Stress @ 30 deg: stable  Varus Stress @ 0 deg: stable  Varus Stress @ 30 deg: stable  Lachman: stable  Ant Drawer: negative   Post Drawer: negative  Posterior Sag Sign: negative  Neurological deficits: SILT dp/sp/t distributions  Motor: 5/5 EHL/FHL/TA/GS    Warm well perfused lower extremity with capillary refill less than 2 seconds and sensation intact to light touch in the terminal nerve distributions. Calf soft and easily compressible without clinical sign of DVT. No palpable popliteal lymphadenopathy    Assessment:     Imaging:   Four view weight bearing radiographs of the bilateral knees obtained today personally reviewed showing no acute fractures or dislocations.  No gross malalignment.  Mild osteoarthritis medially.         Large Joint Aspiration/Injection: bilateral knee    Date/Time: 4/21/2025 2:30 PM    Performed by: Chrystal Birmingham PA-C  Authorized by: Chrystal Birmingham PA-C    Consent Done?:  Yes (Verbal)  Indications:  Pain and arthritis  Site marked: the procedure site was marked    Timeout: prior to procedure the correct patient, procedure, and site was verified    Prep: patient was prepped and draped in usual sterile fashion    Local anesthesia used?: No      Details:  Needle Size:  22 G  Ultrasonic Guidance for needle placement?: No    Approach:  Anterolateral  Location:  Knee  Laterality:  Bilateral  Site:  Bilateral knee  Medications (Right):  12 mg betamethasone acetate-betamethasone sodium phosphate 6 mg/mL; 2 mL LIDOcaine HCL 10 mg/ml (1%) 10 mg/mL (1 %)  Medications (Left):  12 mg betamethasone acetate-betamethasone sodium phosphate 6 mg/mL; 2 mL LIDOcaine HCL 10 mg/ml (1%) 10 mg/mL (1 %)  Patient tolerance:  Patient tolerated  the procedure well with no immediate complications          1. Primary osteoarthritis of right knee    2. Primary osteoarthritis of left knee            Plan:       Orders Placed This Encounter    Large Joint Aspiration/Injection    X-Ray Knee Complete 4 Or More Views Bilat     She has mild osteoarthritis changes of both of her knees.  She usually does well with Mobic and Voltaren gel.  She is on a her feet a lot at the restaurant. These were provided as above.  Post-injection care was discussed.  She will follow up with me as needed.  Continue symptomatic treatment with her Mobic and Voltaren gel as needed.  Low-impact exercises.  Weight loss.  All of her questions were answered and she was happy and in agreement.